# Patient Record
Sex: FEMALE | Race: WHITE | NOT HISPANIC OR LATINO
[De-identification: names, ages, dates, MRNs, and addresses within clinical notes are randomized per-mention and may not be internally consistent; named-entity substitution may affect disease eponyms.]

---

## 2017-05-26 ENCOUNTER — APPOINTMENT (OUTPATIENT)
Dept: OBGYN | Facility: CLINIC | Age: 62
End: 2017-05-26

## 2017-05-26 VITALS
HEIGHT: 63 IN | DIASTOLIC BLOOD PRESSURE: 77 MMHG | HEART RATE: 69 BPM | SYSTOLIC BLOOD PRESSURE: 128 MMHG | BODY MASS INDEX: 20.38 KG/M2 | WEIGHT: 115 LBS

## 2017-05-26 DIAGNOSIS — Z01.419 ENCOUNTER FOR GYNECOLOGICAL EXAMINATION (GENERAL) (ROUTINE) W/OUT ABNORMAL FINDINGS: ICD-10-CM

## 2017-05-30 ENCOUNTER — TRANSCRIPTION ENCOUNTER (OUTPATIENT)
Age: 62
End: 2017-05-30

## 2017-06-02 LAB — CYTOLOGY CVX/VAG DOC THIN PREP: NORMAL

## 2017-11-07 ENCOUNTER — APPOINTMENT (OUTPATIENT)
Dept: SURGICAL ONCOLOGY | Facility: CLINIC | Age: 62
End: 2017-11-07
Payer: COMMERCIAL

## 2017-11-07 VITALS
HEART RATE: 72 BPM | DIASTOLIC BLOOD PRESSURE: 79 MMHG | WEIGHT: 119.03 LBS | SYSTOLIC BLOOD PRESSURE: 153 MMHG | HEIGHT: 63 IN | BODY MASS INDEX: 21.09 KG/M2 | OXYGEN SATURATION: 99 % | RESPIRATION RATE: 16 BRPM

## 2017-11-07 PROCEDURE — 99214 OFFICE O/P EST MOD 30 MIN: CPT

## 2018-06-01 ENCOUNTER — APPOINTMENT (OUTPATIENT)
Dept: OBGYN | Facility: CLINIC | Age: 63
End: 2018-06-01
Payer: COMMERCIAL

## 2018-06-01 VITALS — DIASTOLIC BLOOD PRESSURE: 73 MMHG | WEIGHT: 118 LBS | SYSTOLIC BLOOD PRESSURE: 139 MMHG | BODY MASS INDEX: 20.9 KG/M2

## 2018-06-01 PROCEDURE — 99396 PREV VISIT EST AGE 40-64: CPT

## 2018-06-08 LAB — CYTOLOGY CVX/VAG DOC THIN PREP: NORMAL

## 2018-11-06 ENCOUNTER — APPOINTMENT (OUTPATIENT)
Dept: SURGICAL ONCOLOGY | Facility: CLINIC | Age: 63
End: 2018-11-06
Payer: COMMERCIAL

## 2018-11-06 VITALS
SYSTOLIC BLOOD PRESSURE: 139 MMHG | HEIGHT: 63 IN | WEIGHT: 115 LBS | BODY MASS INDEX: 20.38 KG/M2 | HEART RATE: 60 BPM | DIASTOLIC BLOOD PRESSURE: 85 MMHG | RESPIRATION RATE: 14 BRPM

## 2018-11-06 PROCEDURE — 99213 OFFICE O/P EST LOW 20 MIN: CPT

## 2019-06-18 ENCOUNTER — APPOINTMENT (OUTPATIENT)
Dept: OBGYN | Facility: CLINIC | Age: 64
End: 2019-06-18
Payer: COMMERCIAL

## 2019-06-18 ENCOUNTER — TRANSCRIPTION ENCOUNTER (OUTPATIENT)
Age: 64
End: 2019-06-18

## 2019-06-18 VITALS — WEIGHT: 115 LBS | SYSTOLIC BLOOD PRESSURE: 152 MMHG | DIASTOLIC BLOOD PRESSURE: 74 MMHG | BODY MASS INDEX: 20.37 KG/M2

## 2019-06-18 DIAGNOSIS — Z80.1 FAMILY HISTORY OF MALIGNANT NEOPLASM OF TRACHEA, BRONCHUS AND LUNG: ICD-10-CM

## 2019-06-18 DIAGNOSIS — Z01.419 ENCOUNTER FOR GYNECOLOGICAL EXAMINATION (GENERAL) (ROUTINE) W/OUT ABNORMAL FINDINGS: ICD-10-CM

## 2019-06-18 PROCEDURE — 99396 PREV VISIT EST AGE 40-64: CPT

## 2019-06-18 NOTE — PHYSICAL EXAM
[Awake] : awake [Alert] : alert [Labia Majora] : labia major [Labia Minora] : labia minora [Normal] : clitoris [External Hemorrhoid] : no external hemorrhoids [FreeTextEntry4] : fair tone no discharge [FreeTextEntry5] : non tender PAP done [FreeTextEntry6] : nl bimanual and no pelvic masses [FreeTextEntry7] : non tender non uterine or adnexal masses

## 2019-06-24 LAB — CYTOLOGY CVX/VAG DOC THIN PREP: NORMAL

## 2019-11-12 ENCOUNTER — APPOINTMENT (OUTPATIENT)
Dept: SURGICAL ONCOLOGY | Facility: CLINIC | Age: 64
End: 2019-11-12
Payer: COMMERCIAL

## 2019-11-12 VITALS
DIASTOLIC BLOOD PRESSURE: 83 MMHG | RESPIRATION RATE: 16 BRPM | TEMPERATURE: 98.4 F | HEART RATE: 64 BPM | HEIGHT: 63 IN | BODY MASS INDEX: 20.38 KG/M2 | SYSTOLIC BLOOD PRESSURE: 135 MMHG | OXYGEN SATURATION: 99 % | WEIGHT: 115 LBS

## 2019-11-12 PROCEDURE — 99213 OFFICE O/P EST LOW 20 MIN: CPT

## 2019-11-12 NOTE — ADDENDUM
[FreeTextEntry1] : I, Carlos Wells, acted soley as a scribe for Dr. Bhupendra Harley on 11/12/2019\par

## 2019-11-12 NOTE — HISTORY OF PRESENT ILLNESS
[de-identified] : 65 y/o female presents for a f/u visit for her annual breast exam. She reports mild HTN which is well controlled with medication. \par \par MMG/US 10/31/19:\par Bilateral implants. Stable cyst in LT breast. \par No mammographic evidence of malignancy. LT breast 12:00, 1cm fn, 0.6x0.2x0.6cm cyst, no significant change. There is no evidence of skin thickening or ductal ectasia. There is no evidence of axillary adenopathy. \par BIRADS 2 Benign findings \par \par Pertinent Hx:\par She was initially seen 20 years ago for bilateral palpable findings on SBE which ultimately proved to be cysts. She underwent multiple aspirations of symptomatic cysts with benign findings. Her history is notable for bilateral implant revision in January 2016 (from prior placed in 1999) by Dr. Brad Cohn. She has no family history of breast or ovarian cancer. She has been taking Evista for treatment of osteoporosis. \par \par Today, on 11/12/19,  the pt was w/o any complaints. Denies palpable breast masses, nipple discharge, skin changes, inversion of breast pain. Denies constitutional symptoms

## 2019-11-12 NOTE — PHYSICAL EXAM
[Normal] : supple, no neck mass and thyroid not enlarged [Normal Neck Lymph Nodes] : normal neck lymph nodes  [Normal Groin Lymph Nodes] : normal groin lymph nodes [Normal] : oriented to person, place and time, with appropriate affect [FreeTextEntry1] : I, Carlos Wells, was present for the physical exam.\par  [de-identified] : Normal S1,S2. Regular rate and rhythm [de-identified] : Bilateral implants in place. Complete normal breast examination performed supine and upright revealed no palpable masses, nipple discharge, inversion, deviation, or enlarge axillary lymph nodes, or supraclavicular lymph nodes. [de-identified] : Clear breath sounds bilaterally, normal respiratory effort\par \par

## 2019-11-12 NOTE — ASSESSMENT
[FreeTextEntry1] : Imp:\par Bilateral implants in place. \par No evidence of new or recurrent lesions. Breast imaging failed to identify any suspicious lesions. No suspicious lesions on exam. \par \par Plan: \par Continue yearly surveillance with next appointment in November 2020. \par Continue yearly MMG/US in 2020. \par

## 2020-07-10 ENCOUNTER — APPOINTMENT (OUTPATIENT)
Dept: OBGYN | Facility: CLINIC | Age: 65
End: 2020-07-10
Payer: COMMERCIAL

## 2020-07-10 VITALS — BODY MASS INDEX: 21.26 KG/M2 | WEIGHT: 120 LBS | SYSTOLIC BLOOD PRESSURE: 125 MMHG | DIASTOLIC BLOOD PRESSURE: 80 MMHG

## 2020-07-10 DIAGNOSIS — M81.0 AGE-RELATED OSTEOPOROSIS W/OUT CURRENT PATHOLOGICAL FRACTURE: ICD-10-CM

## 2020-07-10 PROCEDURE — 99396 PREV VISIT EST AGE 40-64: CPT

## 2020-07-10 NOTE — PHYSICAL EXAM
[Awake] : awake [Alert] : alert [Labia Majora] : labia major [Normal] : clitoris [Labia Minora] : labia minora [External Hemorrhoid] : no external hemorrhoids [FreeTextEntry5] : non tender PAP done [FreeTextEntry4] : nl  slight atrophia [FreeTextEntry7] : no uterine or adnexal masses [FreeTextEntry6] : nl bimaual

## 2020-07-15 LAB — CYTOLOGY CVX/VAG DOC THIN PREP: ABNORMAL

## 2021-06-10 ENCOUNTER — NON-APPOINTMENT (OUTPATIENT)
Age: 66
End: 2021-06-10

## 2021-06-29 ENCOUNTER — NON-APPOINTMENT (OUTPATIENT)
Age: 66
End: 2021-06-29

## 2021-07-23 ENCOUNTER — APPOINTMENT (OUTPATIENT)
Dept: OBGYN | Facility: CLINIC | Age: 66
End: 2021-07-23
Payer: COMMERCIAL

## 2021-07-23 VITALS
SYSTOLIC BLOOD PRESSURE: 110 MMHG | BODY MASS INDEX: 20.38 KG/M2 | HEIGHT: 63 IN | WEIGHT: 115 LBS | DIASTOLIC BLOOD PRESSURE: 72 MMHG

## 2021-07-23 PROCEDURE — 99072 ADDL SUPL MATRL&STAF TM PHE: CPT

## 2021-07-23 PROCEDURE — 99397 PER PM REEVAL EST PAT 65+ YR: CPT

## 2021-07-23 NOTE — HISTORY OF PRESENT ILLNESS
[FreeTextEntry1] : 66 yr old for annual exam. She is in good health and has 3 children a;ll boys 1988, 1987, 1990. She has grandchildren . She takes 3-4000U Vit d3. She feels well.  Referral for mammography and breast sonogram. Complete exam done

## 2021-07-23 NOTE — PHYSICAL EXAM
[Appropriately responsive] : appropriately responsive [Alert] : alert [No Acute Distress] : no acute distress [Regular Rate Rhythm] : regular rate rhythm [No Lymphadenopathy] : no lymphadenopathy [No Murmurs] : no murmurs [Clear to Auscultation B/L] : clear to auscultation bilaterally [Soft] : soft [Non-tender] : non-tender [Oriented x3] : oriented x3 [FreeTextEntry3] : no thyromegaly [FreeTextEntry7] : no organomegaly [Examination Of The Breasts] : a normal appearance [No Masses] : no breast masses were palpable [Labia Majora] : normal [Labia Minora] : normal [Normal] : normal [FreeTextEntry4] : some atrophy [FreeTextEntry5] : non tender PAP done [FreeTextEntry6] : no uterine or adnexal masses [FreeTextEntry8] : nl bimanual

## 2021-07-23 NOTE — DISCUSSION/SUMMARY
[FreeTextEntry1] : 66 yr old complete exam.Referral for mammography and breast exam. She is in good health  hypothyroid and high B/P treated.

## 2021-07-30 LAB — CYTOLOGY CVX/VAG DOC THIN PREP: ABNORMAL

## 2021-11-23 ENCOUNTER — APPOINTMENT (OUTPATIENT)
Dept: SURGICAL ONCOLOGY | Facility: CLINIC | Age: 66
End: 2021-11-23
Payer: COMMERCIAL

## 2021-11-23 PROCEDURE — 99213 OFFICE O/P EST LOW 20 MIN: CPT

## 2021-11-23 NOTE — HISTORY OF PRESENT ILLNESS
[de-identified] : 65 y/o female presents for a f/u visit for her annual breast exam. \par \par She was initially seen 20 years ago for bilateral palpable findings on SBE which ultimately proved to be cysts. She underwent multiple aspirations of symptomatic cysts with benign findings. Her history is notable for bilateral implant revision in January 2016 (from prior placed in 1999) by Dr. Brad Cohn. She has no family history of breast or ovarian cancer. She has been taking Evista for treatment of osteoporosis. \par \par Screening Mammogram and US performed on 11/15/21 revealed no evidence of malignancy, BIRADS 1.\par \par Pt. was found to have thyroid nodules on recent ultrasound, one which may need a biopsy. Pt. is looking for a specialist. \par \par Denies palpable breast masses, nipple discharge, skin changes, inversion of breast pain. Denies constitutional symptoms

## 2021-11-23 NOTE — PHYSICAL EXAM
[Normal] : supple, no neck mass and thyroid not enlarged [Normal Supraclavicular Lymph Nodes] : normal supraclavicular lymph nodes [Normal Axillary Lymph Nodes] : normal axillary lymph nodes [Normal] : oriented to person, place and time, with appropriate affect [FreeTextEntry1] : I, Carlos Wells, was present for the physical exam.\par  [de-identified] : Normal S1,S2. Regular rate and rhythm [de-identified] : Bilateral implants in place. Complete normal breast examination performed supine and upright revealed no palpable masses, nipple discharge, inversion, deviation, or enlarge axillary lymph nodes, or supraclavicular lymph nodes. [de-identified] : Clear breath sounds bilaterally, normal respiratory effort\par \par

## 2021-11-23 NOTE — ASSESSMENT
[FreeTextEntry1] : Imp:\par Bilateral implants in place. \par No evidence of new or recurrent lesions. Breast imaging failed to identify any suspicious lesions. No suspicious lesions on exam. \par MMG/US 11/2021- BIRADS 1\par \par Plan: \par Next MMG/US due 11/2022 (ordered)\par Thyroid nodules- referral to Dr. Marley Haile \par RTO 1 year \par

## 2022-01-06 ENCOUNTER — APPOINTMENT (OUTPATIENT)
Dept: OTOLARYNGOLOGY | Facility: CLINIC | Age: 67
End: 2022-01-06
Payer: MEDICARE

## 2022-01-06 VITALS
DIASTOLIC BLOOD PRESSURE: 85 MMHG | HEART RATE: 89 BPM | BODY MASS INDEX: 20.38 KG/M2 | SYSTOLIC BLOOD PRESSURE: 139 MMHG | WEIGHT: 115 LBS | HEIGHT: 63 IN

## 2022-01-06 DIAGNOSIS — Z78.9 OTHER SPECIFIED HEALTH STATUS: ICD-10-CM

## 2022-01-06 PROCEDURE — 99204 OFFICE O/P NEW MOD 45 MIN: CPT

## 2022-01-06 RX ORDER — LOSARTAN POTASSIUM 50 MG/1
50 TABLET, FILM COATED ORAL
Refills: 0 | Status: ACTIVE | COMMUNITY

## 2022-01-06 RX ORDER — RALOXIFENE HYDROCHLORIDE 60 MG/1
60 TABLET, FILM COATED ORAL DAILY
Qty: 90 | Refills: 3 | Status: COMPLETED | COMMUNITY
Start: 2020-07-10 | End: 2022-01-06

## 2022-01-06 NOTE — CONSULT LETTER
[Dear  ___] : Dear  [unfilled], [Consult Letter:] : I had the pleasure of evaluating your patient, [unfilled]. [Please see my note below.] : Please see my note below. [Consult Closing:] : Thank you very much for allowing me to participate in the care of this patient.  If you have any questions, please do not hesitate to contact me. [Sincerely,] : Sincerely, [FreeTextEntry2] : Dr Bhupendra Harley [FreeTextEntry3] : \par Triston Haile MD, FACS\par \par Otolaryngology-Head and Neck Surgery\par [employer] [facility]\par

## 2022-01-06 NOTE — HISTORY OF PRESENT ILLNESS
[de-identified] : This is a 66 yro female patient with hypothyroidism referred by Dr. Harley for thyroid nodule. This was found many years ago. Pt reports change in nodule in 2018, had  biopsy 9/2018 and was benign per patient. Pt has been having annual US with PCP. Taking Levothyroxine 50mcg for the past 12 years.  \par No neck pain, dysphagia, dysphonia or dyspnea. No fever, chills, weight loss. \par Pt c/o palpitations for the past month, increased after starting steroids for URI. Has f/up with cardiologist next week. \par Patient denies h/o radiation and has no family h/o thyroid cancer.\par Pt smoked socially on/off for about 15 years. Quit 5 years ago in 2017. \par \par LABS 9/9/2021\par TSH 2.3 wnl\par Free T4 1.6 wnl \par \par US ([facility]) 10/25/2021:\par Nonenlarged thyroid gland with b/l nodules.\par The previously noted 1cm solid lower pole left thyroid lobe nodule is w/o significant change. \par Identified in the lower pole R thyroid lobe nodules (0.9 x 0.6 x 0.8cm and 1.1 x 0.9 x 0.8cm) both ill-defined and newly demonstrated upper and mid pole left thyroid nodule also noted (0.8 x 0.3 x 0.4cm and 0.3 x 0.2 x0.2cm)\par

## 2022-01-15 ENCOUNTER — OUTPATIENT (OUTPATIENT)
Dept: OUTPATIENT SERVICES | Facility: HOSPITAL | Age: 67
LOS: 1 days | End: 2022-01-15
Payer: MEDICARE

## 2022-01-15 ENCOUNTER — APPOINTMENT (OUTPATIENT)
Dept: ULTRASOUND IMAGING | Facility: IMAGING CENTER | Age: 67
End: 2022-01-15
Payer: MEDICARE

## 2022-01-15 DIAGNOSIS — E04.1 NONTOXIC SINGLE THYROID NODULE: ICD-10-CM

## 2022-01-15 PROCEDURE — 76536 US EXAM OF HEAD AND NECK: CPT

## 2022-01-15 PROCEDURE — 76536 US EXAM OF HEAD AND NECK: CPT | Mod: 26

## 2022-01-25 ENCOUNTER — NON-APPOINTMENT (OUTPATIENT)
Age: 67
End: 2022-01-25

## 2022-06-08 ENCOUNTER — NON-APPOINTMENT (OUTPATIENT)
Age: 67
End: 2022-06-08

## 2022-07-05 ENCOUNTER — OUTPATIENT (OUTPATIENT)
Dept: OUTPATIENT SERVICES | Facility: HOSPITAL | Age: 67
LOS: 1 days | End: 2022-07-05

## 2022-07-05 ENCOUNTER — APPOINTMENT (OUTPATIENT)
Dept: ULTRASOUND IMAGING | Facility: CLINIC | Age: 67
End: 2022-07-05

## 2022-07-05 DIAGNOSIS — E04.1 NONTOXIC SINGLE THYROID NODULE: ICD-10-CM

## 2022-07-05 PROCEDURE — 76536 US EXAM OF HEAD AND NECK: CPT | Mod: 26

## 2022-07-11 ENCOUNTER — APPOINTMENT (OUTPATIENT)
Dept: OBGYN | Facility: CLINIC | Age: 67
End: 2022-07-11

## 2022-07-11 VITALS
BODY MASS INDEX: 20.91 KG/M2 | HEIGHT: 63 IN | SYSTOLIC BLOOD PRESSURE: 129 MMHG | DIASTOLIC BLOOD PRESSURE: 72 MMHG | WEIGHT: 118 LBS

## 2022-07-11 DIAGNOSIS — Z01.419 ENCOUNTER FOR GYNECOLOGICAL EXAMINATION (GENERAL) (ROUTINE) W/OUT ABNORMAL FINDINGS: ICD-10-CM

## 2022-07-11 PROCEDURE — G0101: CPT | Mod: GA

## 2022-07-11 PROCEDURE — 82270 OCCULT BLOOD FECES: CPT

## 2022-07-11 NOTE — PLAN
[FreeTextEntry1] : Examination Pap smear was done.  Patient was given prescription for mammogram and sonogram.  Patient has an appointment for bone density in September.

## 2022-07-11 NOTE — PHYSICAL EXAM
[Appropriately responsive] : appropriately responsive [Alert] : alert [No Acute Distress] : no acute distress [No Lymphadenopathy] : no lymphadenopathy [Regular Rate Rhythm] : regular rate rhythm [No Murmurs] : no murmurs [Clear to Auscultation B/L] : clear to auscultation bilaterally [Soft] : soft [Non-tender] : non-tender [Non-distended] : non-distended [No HSM] : No HSM [No Lesions] : no lesions [No Mass] : no mass [Oriented x3] : oriented x3 [Examination Of The Breasts] : a normal appearance [No Masses] : no breast masses were palpable [Labia Majora] : normal [Labia Minora] : normal [Normal] : normal [Uterine Adnexae] : normal [FreeTextEntry9] : Negative Seracult

## 2022-07-11 NOTE — HISTORY OF PRESENT ILLNESS
[FreeTextEntry1] : 67 year-old patient for gynecological exam without any complaints.\par Patient with a breast implants and sees the breast surgeon every year for cystic breast\par  [Mammogramdate] : 2021 [PapSmeardate] : 2021 [BoneDensityDate] : 2022 [ColonoscopyDate] : 2014 [Currently In Menopause] : currently in menopause [Menopause Age: ____] : age at menopause was [unfilled]

## 2022-07-13 LAB — HPV HIGH+LOW RISK DNA PNL CVX: NOT DETECTED

## 2022-07-15 ENCOUNTER — NON-APPOINTMENT (OUTPATIENT)
Age: 67
End: 2022-07-15

## 2022-07-18 LAB — CYTOLOGY CVX/VAG DOC THIN PREP: ABNORMAL

## 2022-08-02 ENCOUNTER — APPOINTMENT (OUTPATIENT)
Dept: OTOLARYNGOLOGY | Facility: CLINIC | Age: 67
End: 2022-08-02

## 2022-08-02 VITALS
OXYGEN SATURATION: 99 % | HEIGHT: 63 IN | HEART RATE: 53 BPM | DIASTOLIC BLOOD PRESSURE: 84 MMHG | SYSTOLIC BLOOD PRESSURE: 138 MMHG

## 2022-08-02 DIAGNOSIS — E04.1 NONTOXIC SINGLE THYROID NODULE: ICD-10-CM

## 2022-08-02 PROCEDURE — 99213 OFFICE O/P EST LOW 20 MIN: CPT

## 2022-08-02 RX ORDER — LEVOTHYROXINE SODIUM 0.05 MG/1
50 TABLET ORAL
Refills: 0 | Status: COMPLETED | COMMUNITY
End: 2022-08-02

## 2022-08-02 NOTE — HISTORY OF PRESENT ILLNESS
[de-identified] : This is a 67 yro female patient with h/o hypothyroidism referred by Dr. Harley for thyroid nodule. This was found many years ago. Pt reports change in 1 nodule in 2018, had  biopsy 9/2018 and was benign per patient. Pt has been having annual US with PCP Dr. Martha Watts. Pt was found to be hyperthyroid in March 2022 (TSH 0.020 and Free T4 2.0)  and was advised to stop the Levothyroxine. TSH and Free T4 were wnl in June 2022, so pt is no longer taking thyroid med.  Pt seeing PCP again in September 2022.\par No neck pain, dysphagia, dysphonia or dyspnea. No fever, chills, weight loss. \par Recent US 7/2022 showed decrease in size of nodules. \par \par LABS\par 9/7/2021: TSH 2.340, T4 1.60\par 3/7/2021: TSH 0.020 (L), T4 2.00 (H)\par 6/13/2022: TSH 4.450, T4 1.31\par \par US thyroid 7/5/2022:\par IMPRESSION:\par The previously noted nodules in the right thyroid upper pole (was 0.6cm now 4mm) and left thyroid midpole (was 1.2cm)  had resolved or markedly decreased in size consistent with an inflammatory process, likely related to resolution of subacute thyroiditis.\par Additional left thyroid nodule, unchanged. Consider follow-up ultrasound in one year.\par TI-RAD 4: Moderately suspicious (FNA if > 1.5 cm, Follow if > 1 cm)\par \par

## 2022-08-02 NOTE — CONSULT LETTER
[Dear  ___] : Dear  [unfilled], [Courtesy Letter:] : I had the pleasure of seeing your patient, [unfilled], in my office today. [Please see my note below.] : Please see my note below. [Consult Closing:] : Thank you very much for allowing me to participate in the care of this patient.  If you have any questions, please do not hesitate to contact me. [Sincerely,] : Sincerely, [FreeTextEntry2] : Dr Bhupendra Harley  [FreeTextEntry3] : \par Triston Haile MD, FACS\par \par Otolaryngology-Head and Neck Surgery\par [employer] at [facility]\par

## 2022-10-04 ENCOUNTER — NON-APPOINTMENT (OUTPATIENT)
Age: 67
End: 2022-10-04

## 2022-11-22 ENCOUNTER — APPOINTMENT (OUTPATIENT)
Dept: SURGICAL ONCOLOGY | Facility: CLINIC | Age: 67
End: 2022-11-22

## 2022-11-22 VITALS
BODY MASS INDEX: 20.38 KG/M2 | RESPIRATION RATE: 16 BRPM | OXYGEN SATURATION: 97 % | DIASTOLIC BLOOD PRESSURE: 83 MMHG | WEIGHT: 115 LBS | HEIGHT: 63 IN | SYSTOLIC BLOOD PRESSURE: 128 MMHG | HEART RATE: 63 BPM

## 2022-11-22 DIAGNOSIS — N60.02 SOLITARY CYST OF RIGHT BREAST: ICD-10-CM

## 2022-11-22 DIAGNOSIS — N60.01 SOLITARY CYST OF RIGHT BREAST: ICD-10-CM

## 2022-11-22 PROCEDURE — 99213 OFFICE O/P EST LOW 20 MIN: CPT

## 2022-11-22 NOTE — PHYSICAL EXAM
[Normal] : supple, no neck mass and thyroid not enlarged [Normal Supraclavicular Lymph Nodes] : normal supraclavicular lymph nodes [Normal Axillary Lymph Nodes] : normal axillary lymph nodes [Normal] : oriented to person, place and time, with appropriate affect [FreeTextEntry1] : SC present for exam  [de-identified] : Normal S1,S2. Regular rate and rhythm [de-identified] : Bilateral implants in place. Complete normal breast examination performed supine and upright revealed no palpable masses, nipple discharge, inversion, deviation, or enlarge axillary lymph nodes, or supraclavicular lymph nodes. [de-identified] : Clear breath sounds bilaterally, normal respiratory effort\par \par

## 2022-11-22 NOTE — ASSESSMENT
[FreeTextEntry1] : Imp:\par Bilateral implants in place. \par No evidence of new or recurrent lesions. Breast imaging failed to identify any suspicious lesions. No suspicious lesions on exam. \par MMG/US 11/2022- BI-RADS 2\par \par Plan: \par Next MMG/US due 11/2023 (ordered)\par RTO 1 year \par \par \par \par All medical entries were at my, Dr. Bhupendra Harley, direction. I have reviewed the chart and agree that the record accurately reflects my personal performance of the history, physical exam, assessment and plan. Our office Nurse Practitioner was present of the duration of the office visit.

## 2022-11-22 NOTE — HISTORY OF PRESENT ILLNESS
[de-identified] : 65 y/o female presents for a f/u visit for her annual breast exam. \par \par She was initially seen 20 years ago for bilateral palpable findings on SBE which ultimately proved to be cysts. She underwent multiple aspirations of symptomatic cysts with benign findings. Her history is notable for bilateral implant revision in January 2016 (from prior placed in 1999) by Dr. Brad Cohn. She has no family history of breast or ovarian cancer. She has been taking Evista for treatment of osteoporosis. \par \par Screening Mammogram and US performed on 11/16/22 revealed no evidence of malignancy, BI-RADS 2\par \par Followed by Dr. Haile for thyroid nodules. Since her last US her nodules are decreasing in size. She currently is not taking thyroid medication. She was diagnosed with hypothyroidism 16 years ago and always had taken Synthroid .. she received the covid vaccine and went into hyperthyroidism. Her labs are currently stable off thyroid medication. \par \par Denies palpable breast masses, nipple discharge, skin changes, inversion of breast pain. Denies constitutional symptoms

## 2023-08-03 ENCOUNTER — NON-APPOINTMENT (OUTPATIENT)
Age: 68
End: 2023-08-03